# Patient Record
Sex: FEMALE | Race: OTHER | Employment: STUDENT | URBAN - METROPOLITAN AREA
[De-identification: names, ages, dates, MRNs, and addresses within clinical notes are randomized per-mention and may not be internally consistent; named-entity substitution may affect disease eponyms.]

---
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
## 2024-12-20 ENCOUNTER — HOSPITAL ENCOUNTER (EMERGENCY)
Facility: HOSPITAL | Age: 19
Discharge: HOME | End: 2024-12-20
Attending: EMERGENCY MEDICINE
Payer: MEDICAID

## 2024-12-20 VITALS
SYSTOLIC BLOOD PRESSURE: 121 MMHG | WEIGHT: 183 LBS | RESPIRATION RATE: 20 BRPM | DIASTOLIC BLOOD PRESSURE: 73 MMHG | OXYGEN SATURATION: 94 % | BODY MASS INDEX: 32 KG/M2 | HEART RATE: 97 BPM

## 2024-12-20 DIAGNOSIS — T78.2XXA ANAPHYLAXIS, INITIAL ENCOUNTER: Primary | ICD-10-CM

## 2024-12-20 PROCEDURE — 96374 THER/PROPH/DIAG INJ IV PUSH: CPT

## 2024-12-20 PROCEDURE — 99284 EMERGENCY DEPT VISIT MOD MDM: CPT

## 2024-12-20 PROCEDURE — S0028 INJECTION, FAMOTIDINE, 20 MG: HCPCS

## 2024-12-20 PROCEDURE — 96375 TX/PRO/DX INJ NEW DRUG ADDON: CPT

## 2024-12-20 PROCEDURE — 99291 CRITICAL CARE FIRST HOUR: CPT

## 2024-12-20 PROCEDURE — 96372 THER/PROPH/DIAG INJ SC/IM: CPT

## 2024-12-20 RX ORDER — FAMOTIDINE 10 MG/ML
INJECTION, SOLUTION INTRAVENOUS
Status: COMPLETED
Start: 2024-12-20 | End: 2024-12-20

## 2024-12-20 RX ORDER — METHYLPREDNISOLONE SODIUM SUCCINATE 125 MG/2ML
INJECTION INTRAMUSCULAR; INTRAVENOUS
Status: COMPLETED
Start: 2024-12-20 | End: 2024-12-20

## 2024-12-20 RX ORDER — PREDNISONE 20 MG/1
40 TABLET ORAL DAILY
Qty: 8 TABLET | Refills: 0 | Status: SHIPPED | OUTPATIENT
Start: 2024-12-20 | End: 2024-12-24

## 2024-12-20 RX ORDER — FAMOTIDINE 10 MG/ML
20 INJECTION, SOLUTION INTRAVENOUS ONCE
Status: COMPLETED | OUTPATIENT
Start: 2024-12-20 | End: 2024-12-20

## 2024-12-20 RX ORDER — LORAZEPAM 2 MG/ML
1 INJECTION INTRAMUSCULAR ONCE
Status: DISCONTINUED | OUTPATIENT
Start: 2024-12-20 | End: 2024-12-20

## 2024-12-20 RX ORDER — METHYLPREDNISOLONE SODIUM SUCCINATE 125 MG/2ML
125 INJECTION INTRAMUSCULAR; INTRAVENOUS ONCE
Status: COMPLETED | OUTPATIENT
Start: 2024-12-20 | End: 2024-12-20

## 2024-12-20 RX ORDER — DIPHENHYDRAMINE HYDROCHLORIDE 50 MG/ML
50 INJECTION INTRAMUSCULAR; INTRAVENOUS ONCE
Status: COMPLETED | OUTPATIENT
Start: 2024-12-20 | End: 2024-12-20

## 2024-12-20 RX ORDER — CETIRIZINE HYDROCHLORIDE 10 MG/1
10 TABLET ORAL DAILY
Qty: 30 TABLET | Refills: 0 | Status: SHIPPED | OUTPATIENT
Start: 2024-12-20 | End: 2025-01-19

## 2024-12-20 RX ORDER — EPINEPHRINE 0.3 MG/.3ML
0.3 INJECTION SUBCUTANEOUS
Qty: 1 EACH | Refills: 0 | Status: SHIPPED | OUTPATIENT
Start: 2024-12-20 | End: 2025-01-19

## 2024-12-20 RX ORDER — FAMOTIDINE 20 MG/1
20 TABLET, FILM COATED ORAL 2 TIMES DAILY PRN
Qty: 30 TABLET | Refills: 0 | Status: SHIPPED | OUTPATIENT
Start: 2024-12-20 | End: 2025-01-19

## 2024-12-20 RX ORDER — LORAZEPAM 2 MG/ML
INJECTION INTRAMUSCULAR
Status: DISCONTINUED
Start: 2024-12-20 | End: 2024-12-21

## 2024-12-20 RX ORDER — LORAZEPAM 2 MG/ML
1 INJECTION INTRAMUSCULAR ONCE
Status: COMPLETED | OUTPATIENT
Start: 2024-12-20 | End: 2024-12-20

## 2024-12-20 RX ORDER — DIPHENHYDRAMINE HYDROCHLORIDE 50 MG/ML
INJECTION INTRAMUSCULAR; INTRAVENOUS
Status: COMPLETED
Start: 2024-12-20 | End: 2024-12-20

## 2024-12-21 NOTE — DISCHARGE INSTRUCTIONS
Please take the prednisone for 4 days and complete all pills.  Take pepcid and zyrtec for 4 days as well, until done with prednisone

## 2024-12-21 NOTE — ED PROVIDER NOTES
Patient Seen in: [facility] Emergency Department    History     Chief Complaint   Patient presents with    Allergic Rxn Allergies       HPI    18-year-old female with no significant past medical history who presents to the ED for evaluation of allergic reaction.  Mom is at bedside and states that patient had eaten some pecans accidentally and immediately developed difficulty with swallowing, nausea, cough, swelling in the face.  Mom states that patient has had prior allergic reactions but never this severe.  Patient is unable to give much history due to clinical condition    History from Independent Source: Mother gave initial history as stated in HPI.    External Records Reviewed: On chart review, patient last saw primary care doctor in August 2023.  She has a history of obesity, prediabetes, high cholesterol.    History reviewed. History reviewed. No pertinent past medical history.    History reviewed. History reviewed. No pertinent surgical history.      Medications :  Prescriptions Prior to Admission[1]     No family history on file.    Smoking Status:   Social History     Socioeconomic History    Marital status: Single   Tobacco Use    Smoking status: Never    Smokeless tobacco: Never   Vaping Use    Vaping status: Never Used   Substance and Sexual Activity    Alcohol use: Never    Drug use: Never       Constitutional and vital signs reviewed.      Social History and Family History elements reviewed from today, pertinent positives to the presenting problem noted.    Physical Exam     ED Triage Vitals   BP 12/20/24 2006 160/85   Pulse 12/20/24 1956 (!) 149   Resp 12/20/24 1956 (!) 30   Temp --    Temp src --    SpO2 12/20/24 1956 100 %   O2 Device 12/20/24 2006 None (Room air)       Physical Exam   Constitutional: AAOx3, well nourished, extremely anxious, crying  HEENT: Normocephalic, PERRLA, MMM, erythema mild edema noted in the face and lips  CV: s1s2+, RRR, no m/r/g, normal distal  pulses  Pulmonary/Chest: CTA b/l with no rales, wheezes.  No chest wall tenderness  Abdominal: Nontender.  Nondistended. Soft. Bowel sounds are normal.   Neck/Back:   :   Musculoskeletal: Normal range of motion. No deformity.   Neurological: Awake, alert. Normal reflexes. No cranial nerve deficit.    Skin: Skin is warm and dry. No rash noted. No erythema.   Psychiatric:      All measures to prevent infection transmission during my interaction with the patient were taken. The patient was already wearing a droplet mask on my arrival to the room. Personal protective equipment was worn throughout the duration of the exam.      ED Course        Labs Reviewed   RAINBOW DRAW LAVENDER   RAINBOW DRAW LIGHT GREEN   RAINBOW DRAW BLUE   RAINBOW DRAW GOLD     My Independent Interpretation of EKG (if performed):     Monitor Interpretation:   sinus tachycardia as interpreted by me.      Imaging Results Available and Reviewed while in ED: No results found.  ED Medications Administered:   Medications   famotidine (Pepcid) 20 mg/2mL injection 20 mg (20 mg Intravenous Given 12/20/24 2003)   diphenhydrAMINE (Benadryl) 50 mg/mL  injection 50 mg (50 mg Intravenous Given 12/20/24 2002)   methylPREDNISolone sodium succinate (Solu-MEDROL) injection 125 mg (125 mg Intravenous Given 12/20/24 2003)   EPINEPHrine (Adrenalin) 1 MG/ML injection (Allergic Reaction Kit) 0.3 mg (0.3 mg Intramuscular Given 12/20/24 2007)   LORazepam (Ativan) 2 mg/mL injection 1 mg (1 mg Intravenous Given 12/20/24 2004)             MDM     Vitals:    12/20/24 2106 12/20/24 2121 12/20/24 2136 12/20/24 2151   BP: 125/72 117/71 120/73 116/73   Pulse: 96 108 109 105   Resp: 18 16 20 17   SpO2: 98% 98% 99% 98%   Weight:         *I personally reviewed and interpreted all ED vitals.    Independent Interpretation of Studies:     Social Determinants of Health:     Procedures:      Differential/MDM/Shared Decision Making: Differential Diagnosis includes allergic reaction,  anaphylaxis, anxiety reaction, others.      The patient already  has no past medical history on file.  to contribute to the complexity of this ED evaluation.           Medications, Diagnostics, or Disposition considered but not done:     Patient is doing much better after IM epinephrine, IV Solu-Medrol, Pepcid, Benadryl.  Patient also had significant anxiety and improved after a milligram of Ativan..  Management of case was discussed with patient and her father.  They are comfortable with discharge on similar meds for home as well as an EpiPen.  They have been instructed on use of EpiPen.  Patient now has near complete resolution of her anaphylaxis symptoms.    Critical care time exclusive of procedure time spent on this patient was 35 min for taking history from patient examining patient, medical decision-making, reviewing lab work and radiology studies, explaining results to patient and family, discussing with consultants/admitting physician, and documenting in patient's chart.      Condition upon leaving the department: Stable    Disposition and Plan     Clinical Impression:  1. Anaphylaxis, initial encounter        Disposition:  Discharge    Follow-up:  [place] [id number]  [phone]  Call in 2 day(s)        Medications Prescribed:  Current Discharge Medication List        START taking these medications    Details   EPINEPHrine 0.3 MG/0.3ML Injection Solution Auto-injector Inject 0.3 mL (1 each total) into the muscle daily as needed.  Qty: 1 each, Refills: 0      predniSONE 20 MG Oral Tab Take 2 tablets (40 mg total) by mouth daily for 4 days.  Qty: 8 tablet, Refills: 0      famotidine (PEPCID) 20 MG Oral Tab Take 1 tablet (20 mg total) by mouth 2 (two) times daily as needed for Heartburn.  Qty: 30 tablet, Refills: 0      cetirizine 10 MG Oral Tab Take 1 tablet (10 mg total) by mouth daily.  Qty: 30 tablet, Refills: 0                       [1] (Not in a hospital admission)

## (undated) NOTE — LETTER
Date & Time: 10/24/2023, 5:01 PM  Patient: Karen Abbott  Encounter Provider(s):    Christin Quiros MD       To Whom It May Concern:    Karen Abbott was seen and treated in our department on 10/24/2023. She is excused from school for 1-2 days.     If you have any questions or concerns, please do not hesitate to call.        _____________________________  Physician/APC Signature